# Patient Record
Sex: MALE | NOT HISPANIC OR LATINO
[De-identification: names, ages, dates, MRNs, and addresses within clinical notes are randomized per-mention and may not be internally consistent; named-entity substitution may affect disease eponyms.]

---

## 2020-07-09 ENCOUNTER — RX ONLY (OUTPATIENT)
Age: 30
Setting detail: RX ONLY
End: 2020-07-09

## 2020-07-09 ENCOUNTER — APPOINTMENT (RX ONLY)
Dept: URBAN - METROPOLITAN AREA CLINIC 53 | Facility: CLINIC | Age: 30
Setting detail: DERMATOLOGY
End: 2020-07-09

## 2020-07-09 DIAGNOSIS — D22 MELANOCYTIC NEVI: ICD-10-CM

## 2020-07-09 DIAGNOSIS — L73.0 ACNE KELOID: ICD-10-CM

## 2020-07-09 PROBLEM — D22.9 MELANOCYTIC NEVI, UNSPECIFIED: Status: ACTIVE | Noted: 2020-07-09

## 2020-07-09 PROCEDURE — ? COUNSELING

## 2020-07-09 PROCEDURE — ? INTRALESIONAL KENALOG

## 2020-07-09 PROCEDURE — 11900 INJECT SKIN LESIONS </W 7: CPT

## 2020-07-09 PROCEDURE — 99202 OFFICE O/P NEW SF 15 MIN: CPT | Mod: 25

## 2020-07-09 RX ORDER — CLINDAMYCIN PHOSPHATE 10 MG/ML
LOTION TOPICAL
Qty: 1 | Refills: 0 | Status: ERX | COMMUNITY
Start: 2020-07-09

## 2020-07-09 ASSESSMENT — LOCATION DETAILED DESCRIPTION DERM
LOCATION DETAILED: LEFT INFERIOR POSTERIOR NECK
LOCATION DETAILED: MID POSTERIOR NECK

## 2020-07-09 ASSESSMENT — LOCATION SIMPLE DESCRIPTION DERM
LOCATION SIMPLE: POSTERIOR NECK
LOCATION SIMPLE: POSTERIOR NECK

## 2020-07-09 ASSESSMENT — LOCATION ZONE DERM
LOCATION ZONE: NECK
LOCATION ZONE: NECK

## 2020-07-09 NOTE — PROCEDURE: INTRALESIONAL KENALOG
X Size Of Lesion In Cm (Optional): 0
Total Volume Injected (Ccs- Only Use Numbers And Decimals): 1.0
Medical Necessity Clause: This procedure was medically necessary because the lesions that were treated were:
Consent: The risks of atrophy were reviewed with the patient.
Detail Level: Zone
Concentration Of Solution Injected (Mg/Ml): 3.0
Ndc# For Kenalog Only: [id number]
Administered By (Optional): Payal Becker
Include Z78.9 (Other Specified Conditions Influencing Health Status) As An Associated Diagnosis?: No
Kenalog Preparation: Kenalog

## 2020-07-09 NOTE — PROCEDURE: MIPS QUALITY
Detail Level: Detailed
Quality 110: Preventive Care And Screening: Influenza Immunization: Influenza immunization was not ordered or administered, reason not given
Quality 226: Preventive Care And Screening: Tobacco Use: Screening And Cessation Intervention: Tobacco Screening not Performed for Medical Reasons
Quality 431: Preventive Care And Screening: Unhealthy Alcohol Use - Screening: Unhealthy alcohol use screening not performed, reason not otherwise specified

## 2020-08-13 ENCOUNTER — APPOINTMENT (RX ONLY)
Dept: URBAN - METROPOLITAN AREA CLINIC 53 | Facility: CLINIC | Age: 30
Setting detail: DERMATOLOGY
End: 2020-08-13

## 2020-08-13 DIAGNOSIS — L73.0 ACNE KELOID: ICD-10-CM

## 2020-08-13 DIAGNOSIS — D22 MELANOCYTIC NEVI: ICD-10-CM

## 2020-08-13 PROBLEM — D22.9 MELANOCYTIC NEVI, UNSPECIFIED: Status: ACTIVE | Noted: 2020-08-13

## 2020-08-13 PROCEDURE — 99213 OFFICE O/P EST LOW 20 MIN: CPT

## 2020-08-13 PROCEDURE — ? COUNSELING

## 2020-08-13 ASSESSMENT — LOCATION DETAILED DESCRIPTION DERM: LOCATION DETAILED: LEFT INFERIOR POSTERIOR NECK

## 2020-08-13 ASSESSMENT — LOCATION ZONE DERM: LOCATION ZONE: NECK

## 2020-08-13 ASSESSMENT — LOCATION SIMPLE DESCRIPTION DERM: LOCATION SIMPLE: POSTERIOR NECK

## 2020-10-15 ENCOUNTER — RX ONLY (OUTPATIENT)
Age: 30
Setting detail: RX ONLY
End: 2020-10-15

## 2020-10-15 ENCOUNTER — APPOINTMENT (RX ONLY)
Dept: URBAN - METROPOLITAN AREA CLINIC 53 | Facility: CLINIC | Age: 30
Setting detail: DERMATOLOGY
End: 2020-10-15

## 2020-10-15 DIAGNOSIS — L73.0 ACNE KELOID: ICD-10-CM

## 2020-10-15 DIAGNOSIS — D22 MELANOCYTIC NEVI: ICD-10-CM

## 2020-10-15 PROBLEM — D22.9 MELANOCYTIC NEVI, UNSPECIFIED: Status: ACTIVE | Noted: 2020-10-15

## 2020-10-15 PROCEDURE — 99213 OFFICE O/P EST LOW 20 MIN: CPT

## 2020-10-15 PROCEDURE — ? COUNSELING

## 2020-10-15 RX ORDER — CLINDAMYCIN PHOSPHATE 10 MG/ML
LOTION TOPICAL
Qty: 1 | Refills: 0 | Status: ERX

## 2020-10-15 RX ORDER — BENZOYL PEROXIDE 50 MG/ML
1 LOTION TOPICAL QHS
Qty: 1 | Refills: 0 | Status: ERX | COMMUNITY
Start: 2020-10-15

## 2020-10-15 ASSESSMENT — LOCATION SIMPLE DESCRIPTION DERM: LOCATION SIMPLE: POSTERIOR NECK

## 2020-10-15 ASSESSMENT — LOCATION DETAILED DESCRIPTION DERM: LOCATION DETAILED: LEFT INFERIOR POSTERIOR NECK

## 2020-10-15 ASSESSMENT — LOCATION ZONE DERM: LOCATION ZONE: NECK
